# Patient Record
Sex: MALE | Race: BLACK OR AFRICAN AMERICAN | NOT HISPANIC OR LATINO | Employment: UNEMPLOYED | URBAN - METROPOLITAN AREA
[De-identification: names, ages, dates, MRNs, and addresses within clinical notes are randomized per-mention and may not be internally consistent; named-entity substitution may affect disease eponyms.]

---

## 2020-04-07 PROBLEM — I10 BENIGN ESSENTIAL HTN: Status: ACTIVE | Noted: 2020-04-07

## 2020-04-07 PROBLEM — Z79.4 TYPE 2 DIABETES MELLITUS, WITH LONG-TERM CURRENT USE OF INSULIN: Status: ACTIVE | Noted: 2020-04-07

## 2020-04-07 PROBLEM — E11.9 TYPE 2 DIABETES MELLITUS, WITH LONG-TERM CURRENT USE OF INSULIN: Status: ACTIVE | Noted: 2020-04-07

## 2020-04-07 PROBLEM — E87.5 HYPERKALEMIA: Status: ACTIVE | Noted: 2020-04-07

## 2020-04-07 PROBLEM — N18.30 STAGE 3 CHRONIC KIDNEY DISEASE: Status: ACTIVE | Noted: 2020-04-07

## 2024-03-22 ENCOUNTER — DOCUMENTATION ONLY (OUTPATIENT)
Dept: NEPHROLOGY | Facility: CLINIC | Age: 54
End: 2024-03-22

## 2024-03-22 NOTE — PROGRESS NOTES
Nephrology Plan of Care Note    Pt: James Garcia  : 1970  Date: 2024      Attempted x3 times to contact patient at [phone] to discuss recent lab results. This appears to be a non-working number.     No answer at [phone] (Angelika Scott) emergency contact.          Abraham Brumfield MD  [facility]